# Patient Record
Sex: MALE | Race: WHITE | NOT HISPANIC OR LATINO | Employment: PART TIME | URBAN - NONMETROPOLITAN AREA
[De-identification: names, ages, dates, MRNs, and addresses within clinical notes are randomized per-mention and may not be internally consistent; named-entity substitution may affect disease eponyms.]

---

## 2023-12-14 ENCOUNTER — TELEPHONE (OUTPATIENT)
Dept: CARDIOLOGY | Facility: CLINIC | Age: 73
End: 2023-12-14
Payer: MEDICARE

## 2023-12-14 DIAGNOSIS — R07.9 CHEST PAIN, UNSPECIFIED TYPE: ICD-10-CM

## 2023-12-14 NOTE — TELEPHONE ENCOUNTER
Patient seen on consult at [facility] 12/13/2023; cardiology consultation at the request the hospitalist and patient who presents for episodes of sharp, left-sided, atypical chest pain.  ECGs and troponins are both nonischemic and otherwise normal.   Recommendations: Initiate Nitro-Dur patch 0.2 mg daily, patient can be discharged for outpatient stress imaging and follow-up with cardiology within the next 2 weeks.

## 2023-12-18 ENCOUNTER — TELEPHONE (OUTPATIENT)
Dept: CARDIOLOGY | Facility: CLINIC | Age: 73
End: 2023-12-18
Payer: MEDICARE

## 2023-12-18 NOTE — TELEPHONE ENCOUNTER
Left patient voicemail advising to call our office back regarding stress test and follow up with [facility]. Central schedulers have this patient scheduled in [place] for his 2 day stress test and his follow up is 2/27, however should be within 2 weeks per note in chart. Attempt to reach patient again to see if he would like stress moved to [place] and find a spot sooner with [facility].

## 2024-01-02 ENCOUNTER — APPOINTMENT (OUTPATIENT)
Dept: RADIOLOGY | Facility: HOSPITAL | Age: 74
End: 2024-01-02
Payer: MEDICARE

## 2024-01-02 ENCOUNTER — APPOINTMENT (OUTPATIENT)
Dept: CARDIOLOGY | Facility: HOSPITAL | Age: 74
End: 2024-01-02
Payer: MEDICARE

## 2024-01-04 PROBLEM — I71.40 ABDOMINAL AORTIC ANEURYSM (CMS-HCC): Status: ACTIVE | Noted: 2024-01-04

## 2024-01-04 PROBLEM — I25.2 HISTORY OF MI (MYOCARDIAL INFARCTION): Status: ACTIVE | Noted: 2024-01-04

## 2024-01-04 PROBLEM — Z98.62 STATUS POST ANGIOPLASTY: Status: ACTIVE | Noted: 2024-01-04

## 2024-01-04 PROBLEM — I10 HYPERTENSION: Status: ACTIVE | Noted: 2024-01-04

## 2024-01-04 PROBLEM — I25.10 ARTERIOSCLEROSIS OF CORONARY ARTERY: Status: ACTIVE | Noted: 2024-01-04

## 2024-01-04 PROBLEM — E78.2 MIXED HYPERLIPIDEMIA: Status: ACTIVE | Noted: 2024-01-04

## 2024-01-04 RX ORDER — ATORVASTATIN CALCIUM 40 MG/1
40 TABLET, FILM COATED ORAL NIGHTLY
COMMUNITY
Start: 2023-10-07

## 2024-01-04 RX ORDER — ALLOPURINOL 100 MG/1
100 TABLET ORAL DAILY
COMMUNITY
Start: 2023-10-29

## 2024-01-04 RX ORDER — AMLODIPINE BESYLATE 2.5 MG/1
2.5 TABLET ORAL NIGHTLY
COMMUNITY
Start: 2023-01-19 | End: 2024-01-30 | Stop reason: ALTCHOICE

## 2024-01-04 RX ORDER — NITROGLYCERIN 0.4 MG/1
0.4 TABLET SUBLINGUAL EVERY 5 MIN PRN
COMMUNITY

## 2024-01-10 ENCOUNTER — ANCILLARY PROCEDURE (OUTPATIENT)
Dept: RADIOLOGY | Facility: CLINIC | Age: 74
End: 2024-01-10
Payer: MEDICARE

## 2024-01-10 ENCOUNTER — HOSPITAL ENCOUNTER (OUTPATIENT)
Dept: CARDIOLOGY | Facility: CLINIC | Age: 74
Discharge: HOME | End: 2024-01-10
Payer: MEDICARE

## 2024-01-10 VITALS — SYSTOLIC BLOOD PRESSURE: 134 MMHG | DIASTOLIC BLOOD PRESSURE: 86 MMHG | HEART RATE: 68 BPM

## 2024-01-10 DIAGNOSIS — R07.9 CHEST PAIN, UNSPECIFIED TYPE: ICD-10-CM

## 2024-01-10 PROCEDURE — 2500000004 HC RX 250 GENERAL PHARMACY W/ HCPCS (ALT 636 FOR OP/ED): Performed by: INTERNAL MEDICINE

## 2024-01-10 PROCEDURE — A9502 TC99M TETROFOSMIN: HCPCS | Performed by: INTERNAL MEDICINE

## 2024-01-10 PROCEDURE — 93017 CV STRESS TEST TRACING ONLY: CPT

## 2024-01-10 PROCEDURE — 78452 HT MUSCLE IMAGE SPECT MULT: CPT

## 2024-01-10 PROCEDURE — 3430000001 HC RX 343 DIAGNOSTIC RADIOPHARMACEUTICALS: Performed by: INTERNAL MEDICINE

## 2024-01-10 RX ORDER — REGADENOSON 0.08 MG/ML
0.4 INJECTION, SOLUTION INTRAVENOUS ONCE
Status: COMPLETED | OUTPATIENT
Start: 2024-01-10 | End: 2024-01-10

## 2024-01-10 RX ADMIN — TETROFOSMIN 35.1 MILLICURIE: 0.23 INJECTION, POWDER, LYOPHILIZED, FOR SOLUTION INTRAVENOUS at 10:11

## 2024-01-10 RX ADMIN — REGADENOSON 0.4 MG: 0.08 INJECTION, SOLUTION INTRAVENOUS at 10:10

## 2024-01-11 ENCOUNTER — ANCILLARY PROCEDURE (OUTPATIENT)
Dept: RADIOLOGY | Facility: CLINIC | Age: 74
End: 2024-01-11
Payer: MEDICARE

## 2024-01-11 PROCEDURE — A9502 TC99M TETROFOSMIN: HCPCS | Performed by: INTERNAL MEDICINE

## 2024-01-11 PROCEDURE — 93018 CV STRESS TEST I&R ONLY: CPT | Performed by: INTERNAL MEDICINE

## 2024-01-11 PROCEDURE — 93016 CV STRESS TEST SUPVJ ONLY: CPT | Performed by: INTERNAL MEDICINE

## 2024-01-11 PROCEDURE — 78452 HT MUSCLE IMAGE SPECT MULT: CPT | Performed by: INTERNAL MEDICINE

## 2024-01-11 PROCEDURE — 3430000001 HC RX 343 DIAGNOSTIC RADIOPHARMACEUTICALS: Performed by: INTERNAL MEDICINE

## 2024-01-11 RX ADMIN — TETROFOSMIN 35.6 MILLICURIE: 0.23 INJECTION, POWDER, LYOPHILIZED, FOR SOLUTION INTRAVENOUS at 10:18

## 2024-01-30 ENCOUNTER — OFFICE VISIT (OUTPATIENT)
Dept: CARDIOLOGY | Facility: CLINIC | Age: 74
End: 2024-01-30
Payer: MEDICARE

## 2024-01-30 VITALS
DIASTOLIC BLOOD PRESSURE: 84 MMHG | SYSTOLIC BLOOD PRESSURE: 132 MMHG | HEIGHT: 76 IN | HEART RATE: 66 BPM | WEIGHT: 267 LBS | BODY MASS INDEX: 32.51 KG/M2

## 2024-01-30 DIAGNOSIS — Z98.62 STATUS POST ANGIOPLASTY: ICD-10-CM

## 2024-01-30 DIAGNOSIS — I15.9 SECONDARY HYPERTENSION: ICD-10-CM

## 2024-01-30 DIAGNOSIS — R07.9 CHEST PAIN, UNSPECIFIED TYPE: ICD-10-CM

## 2024-01-30 DIAGNOSIS — I25.10 ARTERIOSCLEROSIS OF CORONARY ARTERY: ICD-10-CM

## 2024-01-30 DIAGNOSIS — I25.2 HISTORY OF MI (MYOCARDIAL INFARCTION): ICD-10-CM

## 2024-01-30 DIAGNOSIS — I71.40 ABDOMINAL AORTIC ANEURYSM (AAA), UNSPECIFIED PART, UNSPECIFIED WHETHER RUPTURED (CMS-HCC): ICD-10-CM

## 2024-01-30 DIAGNOSIS — E78.2 MIXED HYPERLIPIDEMIA: ICD-10-CM

## 2024-01-30 PROBLEM — Z78.9 NEVER SMOKED ANY SUBSTANCE: Status: ACTIVE | Noted: 2024-01-30

## 2024-01-30 PROCEDURE — 3079F DIAST BP 80-89 MM HG: CPT | Performed by: INTERNAL MEDICINE

## 2024-01-30 PROCEDURE — 99213 OFFICE O/P EST LOW 20 MIN: CPT | Performed by: INTERNAL MEDICINE

## 2024-01-30 PROCEDURE — 1160F RVW MEDS BY RX/DR IN RCRD: CPT | Performed by: INTERNAL MEDICINE

## 2024-01-30 PROCEDURE — 1159F MED LIST DOCD IN RCRD: CPT | Performed by: INTERNAL MEDICINE

## 2024-01-30 PROCEDURE — 3075F SYST BP GE 130 - 139MM HG: CPT | Performed by: INTERNAL MEDICINE

## 2024-01-30 RX ORDER — ASPIRIN 81 MG/1
81 TABLET ORAL DAILY
COMMUNITY

## 2024-01-30 RX ORDER — LISINOPRIL 5 MG/1
5 TABLET ORAL DAILY
COMMUNITY
End: 2024-05-16 | Stop reason: SDUPTHER

## 2024-01-30 NOTE — LETTER
"January 30, 2024     Yann Rucker DO  [address]  [facility] [place] [address]    Patient: Denis Pinzon   YOB: 1950   Date of Visit: 1/30/2024       Dear Dr. Yann Rucker, DO:    Thank you for referring Denis Pinzon to me for evaluation. Below are my notes for this consultation.  If you have questions, please do not hesitate to call me. I look forward to following your patient along with you.       Sincerely,     Charlie Vazquez DO      CC: No Recipients  ______________________________________________________________________________________    Subjective   Denis Pinzon is a 73 y.o. male       Chief Complaint    Hospital Follow-up; Results          73-year-old gentleman returns for follow-up after being admitted in mid December for chest pain syndrome, ruled out for acute coronary syndrome.  Follow-up stress imaging January of this year is completely normal without evidence of MI or ischemia with normal left ventricular function.    He has had remote PCI of the LAD, dating back to 2018, performed by Dr. Ramos and notably there was no other significant disease, left ventricular function was normal at that time     Patient is doing well from a cardiovascular standpoint, has a small AAA that is being followed by vascular surgery on a regular basis    Recommendations: Continue current therapies, follow-up in 1 year           Review of Systems   All other systems reviewed and are negative.         Visit Vitals  /84 (BP Location: Left arm, Patient Position: Sitting)   Pulse 66   Ht 1.93 m (6' 4\")   Wt 121 kg (267 lb)   BMI 32.50 kg/m²   Smoking Status Never   BSA 2.55 m²        Objective   Physical Exam  Constitutional:       Appearance: Normal appearance. He is normal weight.   HENT:      Nose: Nose normal.   Neck:      Vascular: No carotid bruit.   Cardiovascular:      Rate and Rhythm: Normal rate.      Pulses: Normal pulses.      Heart sounds: Normal heart sounds. "   Pulmonary:      Effort: Pulmonary effort is normal.   Abdominal:      General: Bowel sounds are normal.      Palpations: Abdomen is soft.   Genitourinary:     Rectum: Normal.   Musculoskeletal:         General: Normal range of motion.      Cervical back: Normal range of motion.      Right lower leg: No edema.      Left lower leg: No edema.   Skin:     General: Skin is warm and dry.   Neurological:      General: No focal deficit present.      Mental Status: He is alert.   Psychiatric:         Mood and Affect: Mood normal.         Behavior: Behavior normal.         Thought Content: Thought content normal.         Judgment: Judgment normal.         Current Medications    Current Outpatient Medications:   •  allopurinol (Zyloprim) 100 mg tablet, Take 1 tablet (100 mg) by mouth once daily., Disp: , Rfl:   •  aspirin 81 mg EC tablet, Take 1 tablet (81 mg) by mouth once daily., Disp: , Rfl:   •  atorvastatin (Lipitor) 40 mg tablet, Take 1 tablet (40 mg) by mouth once daily at bedtime., Disp: , Rfl:   •  lisinopril 5 mg tablet, Take 1 tablet (5 mg) by mouth once daily., Disp: , Rfl:   •  nitroglycerin (Nitrostat) 0.4 mg SL tablet, Place 1 tablet (0.4 mg) under the tongue every 5 minutes if needed., Disp: , Rfl:                      Assessment/Plan   1. Chest pain, unspecified type  Follow Up In Cardiology      2. Arteriosclerosis of coronary artery        3. History of MI (myocardial infarction)        4. Status post angioplasty        5. Secondary hypertension        6. Abdominal aortic aneurysm (AAA), unspecified part, unspecified whether ruptured (CMS/[facility])        7. Mixed hyperlipidemia           Scribe Attestation  By signing my name below, Yaneli GARCES LPN  , Scribe   attest that this documentation has been prepared under the direction and in the presence of Charlie Vazquez DO.

## 2024-01-30 NOTE — PROGRESS NOTES
"Subjective   Denis Pinzon is a 73 y.o. male       Chief Complaint    Hospital Follow-up; Results          73-year-old gentleman returns for follow-up after being admitted in mid December for chest pain syndrome, ruled out for acute coronary syndrome.  Follow-up stress imaging January of this year is completely normal without evidence of MI or ischemia with normal left ventricular function.    He has had remote PCI of the LAD, dating back to 2018, performed by Dr. Ramos and notably there was no other significant disease, left ventricular function was normal at that time     Patient is doing well from a cardiovascular standpoint, has a small AAA that is being followed by vascular surgery on a regular basis    Recommendations: Continue current therapies, follow-up in 1 year           Review of Systems   All other systems reviewed and are negative.         Visit Vitals  /84 (BP Location: Left arm, Patient Position: Sitting)   Pulse 66   Ht 1.93 m (6' 4\")   Wt 121 kg (267 lb)   BMI 32.50 kg/m²   Smoking Status Never   BSA 2.55 m²        Objective   Physical Exam  Constitutional:       Appearance: Normal appearance. He is normal weight.   HENT:      Nose: Nose normal.   Neck:      Vascular: No carotid bruit.   Cardiovascular:      Rate and Rhythm: Normal rate.      Pulses: Normal pulses.      Heart sounds: Normal heart sounds.   Pulmonary:      Effort: Pulmonary effort is normal.   Abdominal:      General: Bowel sounds are normal.      Palpations: Abdomen is soft.   Genitourinary:     Rectum: Normal.   Musculoskeletal:         General: Normal range of motion.      Cervical back: Normal range of motion.      Right lower leg: No edema.      Left lower leg: No edema.   Skin:     General: Skin is warm and dry.   Neurological:      General: No focal deficit present.      Mental Status: He is alert.   Psychiatric:         Mood and Affect: Mood normal.         Behavior: Behavior normal.         Thought Content: " Thought content normal.         Judgment: Judgment normal.         Current Medications    Current Outpatient Medications:     allopurinol (Zyloprim) 100 mg tablet, Take 1 tablet (100 mg) by mouth once daily., Disp: , Rfl:     aspirin 81 mg EC tablet, Take 1 tablet (81 mg) by mouth once daily., Disp: , Rfl:     atorvastatin (Lipitor) 40 mg tablet, Take 1 tablet (40 mg) by mouth once daily at bedtime., Disp: , Rfl:     lisinopril 5 mg tablet, Take 1 tablet (5 mg) by mouth once daily., Disp: , Rfl:     nitroglycerin (Nitrostat) 0.4 mg SL tablet, Place 1 tablet (0.4 mg) under the tongue every 5 minutes if needed., Disp: , Rfl:                      Assessment/Plan   1. Chest pain, unspecified type  Follow Up In Cardiology      2. Arteriosclerosis of coronary artery        3. History of MI (myocardial infarction)        4. Status post angioplasty        5. Secondary hypertension        6. Abdominal aortic aneurysm (AAA), unspecified part, unspecified whether ruptured (CMS/HCC)        7. Mixed hyperlipidemia           Scribe Attestation  By signing my name below, Yaneli GARCES LPN  , Scribe   attest that this documentation has been prepared under the direction and in the presence of Charlie Vazquez DO.

## 2024-02-27 ENCOUNTER — APPOINTMENT (OUTPATIENT)
Dept: CARDIOLOGY | Facility: CLINIC | Age: 74
End: 2024-02-27
Payer: COMMERCIAL

## 2024-05-16 DIAGNOSIS — I15.9 SECONDARY HYPERTENSION: ICD-10-CM

## 2024-05-16 RX ORDER — LISINOPRIL 5 MG/1
5 TABLET ORAL DAILY
Qty: 90 TABLET | Refills: 3 | Status: SHIPPED | OUTPATIENT
Start: 2024-05-16 | End: 2025-05-16

## 2025-02-05 ENCOUNTER — APPOINTMENT (OUTPATIENT)
Dept: CARDIOLOGY | Facility: CLINIC | Age: 75
End: 2025-02-05
Payer: COMMERCIAL

## 2025-02-05 VITALS
HEIGHT: 76 IN | HEART RATE: 82 BPM | SYSTOLIC BLOOD PRESSURE: 120 MMHG | BODY MASS INDEX: 31.51 KG/M2 | DIASTOLIC BLOOD PRESSURE: 78 MMHG | WEIGHT: 258.8 LBS

## 2025-02-05 DIAGNOSIS — I10 PRIMARY HYPERTENSION: ICD-10-CM

## 2025-02-05 DIAGNOSIS — Z98.62 STATUS POST ANGIOPLASTY: ICD-10-CM

## 2025-02-05 DIAGNOSIS — E78.2 MIXED HYPERLIPIDEMIA: ICD-10-CM

## 2025-02-05 DIAGNOSIS — Z78.9 NEVER SMOKED ANY SUBSTANCE: ICD-10-CM

## 2025-02-05 DIAGNOSIS — I25.10 ARTERIOSCLEROSIS OF CORONARY ARTERY: ICD-10-CM

## 2025-02-05 DIAGNOSIS — I25.2 HISTORY OF MI (MYOCARDIAL INFARCTION): ICD-10-CM

## 2025-02-05 PROCEDURE — 99214 OFFICE O/P EST MOD 30 MIN: CPT | Performed by: INTERNAL MEDICINE

## 2025-02-05 PROCEDURE — 3078F DIAST BP <80 MM HG: CPT | Performed by: INTERNAL MEDICINE

## 2025-02-05 PROCEDURE — 1159F MED LIST DOCD IN RCRD: CPT | Performed by: INTERNAL MEDICINE

## 2025-02-05 PROCEDURE — 3008F BODY MASS INDEX DOCD: CPT | Performed by: INTERNAL MEDICINE

## 2025-02-05 PROCEDURE — 1160F RVW MEDS BY RX/DR IN RCRD: CPT | Performed by: INTERNAL MEDICINE

## 2025-02-05 PROCEDURE — 3074F SYST BP LT 130 MM HG: CPT | Performed by: INTERNAL MEDICINE

## 2025-02-05 RX ORDER — ATORVASTATIN CALCIUM 80 MG/1
80 TABLET, FILM COATED ORAL NIGHTLY
Qty: 90 TABLET | Refills: 3 | Status: SHIPPED | OUTPATIENT
Start: 2025-02-05 | End: 2026-02-05

## 2025-02-05 NOTE — PROGRESS NOTES
"Subjective   Denis Pinzon is a 74 y.o. male       Chief Complaint    Annual Exam          74-year-old asymptomatic gentleman returns for follow-up and is doing well without any cardiovascular complaints, nitrate usage, hospitalizations.  Most recent LDL is 119; most recent abdominal aortic aneurysm ultrasound via vascular surgery measured 4 x 4.3 cm in the distal aspect with iliac measurements of 1.6 to 1.9 cm.  Blood pressure is under good control.    He has a history of prior single-vessel PCI of the LAD with previous interventional last with no subsequent symptoms or events.    His wife had complicated medical course this past November December and January with 2 strokes and now remains in a nursing home unfortunately with full assistance.  The past 3 months have been very stressful for Mr. Alfred.    Recommendations: Escalate atorvastatin 80 mg daily, repeat lipid panel with high-sensitivity CRP within the next 6 months, continue follow-up with vascular surgery, will see him again in 1 year         Review of Systems   All other systems reviewed and are negative.           Vitals:    02/05/25 0952   BP: 120/78   BP Location: Right arm   Patient Position: Sitting   Pulse: 82   Weight: 117 kg (258 lb 12.8 oz)   Height: 1.93 m (6' 4\")        Objective   Physical Exam  Constitutional:       Appearance: Normal appearance.   HENT:      Nose: Nose normal.   Neck:      Vascular: No carotid bruit.   Cardiovascular:      Rate and Rhythm: Normal rate.      Pulses: Normal pulses.      Heart sounds: Normal heart sounds.   Pulmonary:      Effort: Pulmonary effort is normal.   Abdominal:      General: Bowel sounds are normal.      Palpations: Abdomen is soft.   Musculoskeletal:         General: Normal range of motion.      Cervical back: Normal range of motion.      Right lower leg: No edema.      Left lower leg: No edema.   Skin:     General: Skin is warm and dry.   Neurological:      General: No focal deficit present. "      Mental Status: He is alert.   Psychiatric:         Mood and Affect: Mood normal.         Behavior: Behavior normal.         Thought Content: Thought content normal.         Judgment: Judgment normal.         Allergies  Patient has no known allergies.     Current Medications    Current Outpatient Medications:     allopurinol (Zyloprim) 100 mg tablet, Take 1 tablet (100 mg) by mouth once daily., Disp: , Rfl:     aspirin 81 mg EC tablet, Take 1 tablet (81 mg) by mouth once daily., Disp: , Rfl:     atorvastatin (Lipitor) 40 mg tablet, Take 1 tablet (40 mg) by mouth once daily at bedtime., Disp: , Rfl:     lisinopril 5 mg tablet, Take 1 tablet (5 mg) by mouth once daily., Disp: 90 tablet, Rfl: 3    nitroglycerin (Nitrostat) 0.4 mg SL tablet, Place 1 tablet (0.4 mg) under the tongue every 5 minutes if needed., Disp: , Rfl:                      Assessment/Plan   1. Arteriosclerosis of coronary artery  Follow Up In Cardiology      2. History of MI (myocardial infarction)        3. Status post angioplasty        4. Mixed hyperlipidemia        5. Primary hypertension        6. BMI 31.0-31.9,adult        7. Never smoked any substance                 Scribe Attestation  By signing my name below, IGalina LPN  , Scribe   attest that this documentation has been prepared under the direction and in the presence of Charlie Vazquez DO.     Provider Attestation - Scribe documentation    All medical record entries made by the Scribe were at my direction and personally dictated by me. I have reviewed the chart and agree that the record accurately reflects my personal performance of the history, physical exam, discussion and plan.

## 2025-02-05 NOTE — PATIENT INSTRUCTIONS
Please bring all medicines, vitamins, and herbal supplements with you when you come to the office.    Prescriptions will not be filled unless you are compliant with your follow up appointments or have a follow up appointment scheduled as per instruction of your physician. Refills should be requested at the time of your visit.     BMI was above normal measurement. Current weight: 117 kg (258 lb 12.8 oz)  Weight change since last visit (-) denotes wt loss -8.2 lbs   Weight loss needed to achieve BMI 25: 53.8 Lbs  Weight loss needed to achieve BMI 30: 12.9 Lbs  Provided instructions on dietary changes.

## 2025-06-14 DIAGNOSIS — I15.9 SECONDARY HYPERTENSION: ICD-10-CM

## 2025-06-17 RX ORDER — LISINOPRIL 5 MG/1
5 TABLET ORAL DAILY
Qty: 90 TABLET | Refills: 3 | Status: SHIPPED | OUTPATIENT
Start: 2025-06-17

## 2026-02-10 ENCOUNTER — APPOINTMENT (OUTPATIENT)
Dept: CARDIOLOGY | Facility: CLINIC | Age: 76
End: 2026-02-10
Payer: MEDICARE